# Patient Record
Sex: MALE | Race: AMERICAN INDIAN OR ALASKA NATIVE
[De-identification: names, ages, dates, MRNs, and addresses within clinical notes are randomized per-mention and may not be internally consistent; named-entity substitution may affect disease eponyms.]

---

## 2017-06-16 ENCOUNTER — HOSPITAL ENCOUNTER (EMERGENCY)
Dept: HOSPITAL 42 - ED | Age: 22
Discharge: HOME | End: 2017-06-16
Payer: SELF-PAY

## 2017-06-16 VITALS
DIASTOLIC BLOOD PRESSURE: 85 MMHG | HEART RATE: 73 BPM | SYSTOLIC BLOOD PRESSURE: 132 MMHG | OXYGEN SATURATION: 99 % | TEMPERATURE: 98.7 F | RESPIRATION RATE: 18 BRPM

## 2017-06-16 VITALS — BODY MASS INDEX: 25.1 KG/M2

## 2017-06-16 DIAGNOSIS — B34.9: Primary | ICD-10-CM

## 2017-06-16 NOTE — RAD
HISTORY:

cough  



COMPARISON:

No prior.



TECHNIQUE:

Chest PA and lateral



FINDINGS:



LUNGS:

No active pulmonary disease.



PLEURA:

No significant pleural effusion identified. No pneumothorax apparent.



CARDIOVASCULAR:

Normal.



OSSEOUS STRUCTURES:

No significant abnormalities.



VISUALIZED UPPER ABDOMEN:

Normal.



OTHER FINDINGS:

None.



IMPRESSION:

No active disease.

## 2017-06-16 NOTE — ED PDOC
Arrival/HPI





- General


Chief Complaint: Cough, Cold, Congestion


Time Seen by Provider: 06/16/17 13:52


Historian: Patient





- History of Present Illness


Narrative History of Present Illness (Text): 


06/16/17 14:06


A 21 year old male presents to the emergency department complaining of cough, 

subjective fever and a sore throat. Patient notes cough and subject fever has 

been presents for the past week but the sore throat developed yesterday. 

Patient states he has some chest discomfort when coughing but denies any nausea

, vomiting or other complaints. Patient is also specifically requesting STD 

testing because  he has history of std, although denies any dysuria or other 

complaints. 





Time/Duration: 1 week


Symptom Onset: Gradual


Symptom Course: Other


Quality: Other


Activities at Onset: Rest


Context: Home





Past Medical History





- Provider Review


Nursing Documentation Reviewed: Yes





- Past History


Past History: No Previous





- Infectious Disease


Hx of Infectious Diseases: None





- Tetanus Immunization


Tetanus Immunization: Up to Date





- Past Medical History


Past Medical History: No Previous





- Cardiac


Hx Cardiac Disorders: No





- Pulmonary


Hx Respiratory Disorders: Yes


Hx Asthma: Yes





- Neurological


Hx Neurological Disorder: No





- HEENT


Hx HEENT Disorder: No





- Renal


Hx Renal Disorder: No





- Endocrine/Metabolic


Hx Endocrine Disorders: No





- Hematological/Oncological


Hx Blood Disorders: No





- Integumentary


Hx Dermatological Disorder: No





- Musculoskeletal/Rheumatological


Hx Musculoskeletal Disorders: No





- Gastrointestinal


Hx Gastrointestinal Disorders: No





- Genitourinary/Gynecological


Hx Genitourinary Disorders: No





- Psychiatric


Hx Depression: No


Hx Emotional Abuse: No


Hx Physical Abuse: No


Hx Substance Use: No





- Past Surgical History


Past Surgical History: No Previous





- Surgical History


Hx Musculoskeletal Surgery: Yes


Hx Tonsillectomy: Yes





- Anesthesia


Hx Anesthesia: Yes


Hx Anesthesia Reactions: No


Hx Malignant Hyperthermia: No





- Suicidal Assessment


Feels Threatened In Home Enviroment: No





Family/Social History





- Physician Review


Nursing Documentation Reviewed: Yes


Family/Social History: Unknown Family HX


Smoking Status: Heavy Smoker > 10 Cigarettes Daily


Hx Alcohol Use: No


Hx Substance Use: No


Substance used: Marijuana


Hx Substance Use Treatment: No





Allergies/Home Meds


Allergies/Adverse Reactions: 


Allergies





No Known Allergies Allergy (Verified 12/17/16 15:22)


 








Home Medications: 


 Home Meds











 Medication  Instructions  Recorded  Confirmed


 


No Known Home Med  12/17/16 12/17/16














Review of Systems





- Physician Review


All systems were reviewed & negative as marked: Yes





- Review of Systems


Constitutional: Fevers


Respiratory: Cough.  absent: SOB, Sputum


Cardiovascular: Chest Pain


Gastrointestinal: absent: Nausea, Vomiting





Physical Exam





- Physical Exam


Narrative Physical Exam (Text): 





patient on cellphone and in no acute distress


Vital Signs Reviewed: Yes


Vital Signs











  Temp Pulse Resp BP Pulse Ox


 


 06/16/17 13:39  98.7 F  73  18  132/85  99











Temperature: Afebrile


Blood Pressure: Normal


Pulse: Regular


Respiratory Rate: Normal


Appearance: Positive for: Well-Appearing, Non-Toxic, Comfortable


Pain Distress: None


Mental Status: Positive for: Alert and Oriented X 3





- Systems Exam


Head: Present: Atraumatic, Normocephalic


Pupils: Present: PERRL


Extroacular Muscles: Present: EOMI


Conjunctiva: Present: Normal


Mouth: Present: Moist Mucous Membranes


Pharnyx: Present: ERYTHEMA.  No: EXUDATE, TONSILS ENLARGED, Peritonsilar 

Swelling, Uvular Deviation


Neck: Present: Normal Range of Motion


Respiratory/Chest: Present: Clear to Auscultation, Good Air Exchange.  No: 

Respiratory Distress, Accessory Muscle Use


Cardiovascular: Present: Regular Rate and Rhythm, Normal S1, S2.  No: Murmurs


Abdomen: Present: Normal Bowel Sounds.  No: Tenderness, Distention, Peritoneal 

Signs


Back: Present: Normal Inspection


Upper Extremity: Present: Normal Inspection.  No: Cyanosis, Edema


Lower Extremity: Present: Normal Inspection.  No: Edema


Neurological: Present: GCS=15, CN II-XII Intact, Speech Normal


Skin: Present: Warm, Dry, Normal Color.  No: Rashes


Psychiatric: Present: Alert, Oriented x 3, Normal Insight, Normal Concentration





Medical Decision Making


ED Course and Treatment: 


06/16/17 14:06


Impression:


A 21 ear old male with a cough, subjective fever and sore throat. 





Differential Diagnosis include but are not limited to: pharyngitis vs. 

influenza vs. Bronchitis





Plan:


-- Chest X-ray


-- Rapid Strep


-- Influenza


-- Reassess and disposition





Prior Visits:


Notes and results from previous visits were reviewed. The patient last 

presented to the emergency department on 12/17/16 for sutural removal. 





Progress Notes:











- Lab Interpretations


Lab Results: 


 Lab Results





06/16/17 15:00: Influenza Typ A,B (EIA) Negative for flu a/b, Grp A Beta Strep 

Ag Negative








I have reviewed the lab results: Yes





- RAD Interpretation


Radiology Orders: 








06/16/17 14:02


CHEST TWO VIEWS (PA/LAT) [RAD] Stat 














- Scribe Statement


The provider has reviewed the documentation as recorded by the Scribe





Ioana Olvera





Provider Scribe Attestation:


All medical record entries made by the Scribe were at my direction and 

personally dictated by me. I have reviewed the chart and agree that the record 

accurately reflects my personal performance of the history, physical exam, 

medical decision making, and the department course for this patient. I have 

also personally directed, reviewed, and agree with the discharge instructions 

and disposition.











Disposition/Present on Arrival





- Present on Arrival


Any Indicators Present on Arrival: No


History of DVT/PE: No


History of Uncontrolled Diabetes: No


Urinary Catheter: No


History of Decub. Ulcer: No


History Surgical Site Infection Following: None





- Disposition


Have Diagnosis and Disposition been Completed?: Yes


Diagnosis: 


 Viral syndrome





Disposition: HOME/ ROUTINE


Disposition Time: 15:46


Condition: STABLE


Discharge Instructions (ExitCare):  Chest Pain (ED), Viral Syndrome (ED)


Referrals: 


PCP,NO [Primary Care Provider] - Follow up with primary


[facility] [Outside] - Follow up with primary


[facility] [Outside] - Follow up with primary

## 2018-02-05 ENCOUNTER — HOSPITAL ENCOUNTER (EMERGENCY)
Dept: HOSPITAL 42 - ED | Age: 23
Discharge: HOME | End: 2018-02-05
Payer: COMMERCIAL

## 2018-02-05 VITALS — TEMPERATURE: 98.9 F

## 2018-02-05 VITALS
DIASTOLIC BLOOD PRESSURE: 78 MMHG | HEART RATE: 75 BPM | OXYGEN SATURATION: 99 % | RESPIRATION RATE: 17 BRPM | SYSTOLIC BLOOD PRESSURE: 119 MMHG

## 2018-02-05 VITALS — BODY MASS INDEX: 25.1 KG/M2

## 2018-02-05 DIAGNOSIS — R10.9: Primary | ICD-10-CM

## 2018-02-05 DIAGNOSIS — F17.210: ICD-10-CM

## 2018-02-05 NOTE — ED PDOC
Arrival/HPI





- General


Chief Complaint: Abdominal Pain


Time Seen by Provider: 02/05/18 14:54


Historian: Patient





- History of Present Illness


Narrative History of Present Illness (Text): 





02/05/18 15:18


21yo male with no PMhx present with complaint of intermittent abdominal pain x 

2weeks. States he had diarrhea over a week ago that resolved. He reports that 

he works as a  and usually gets nauseous when he is at work, 

otherwise he denies nausea and vomiting. states pain is usually crampy and 

worse after eating. He couldn't localize the pain. Ge denies fever, chills, 

urinary symptoms, back pain, chest pain, any other complaint.





Past Medical History





- Provider Review


Nursing Documentation Reviewed: Yes





- Past History


Past History: No Previous





- Infectious Disease


Hx of Infectious Diseases: None





- Tetanus Immunization


Tetanus Immunization: Up to Date





- Past Medical History


Past Medical History: No Previous





- Cardiac


Hx Cardiac Disorders: No





- Pulmonary


Hx Respiratory Disorders: Yes


Hx Asthma: Yes





- Neurological


Hx Neurological Disorder: No





- HEENT


Hx HEENT Disorder: No





- Renal


Hx Renal Disorder: No





- Endocrine/Metabolic


Hx Endocrine Disorders: No





- Hematological/Oncological


Hx Blood Disorders: No





- Integumentary


Hx Dermatological Disorder: No





- Musculoskeletal/Rheumatological


Hx Musculoskeletal Disorders: No





- Gastrointestinal


Hx Gastrointestinal Disorders: No





- Genitourinary/Gynecological


Hx Genitourinary Disorders: No





- Psychiatric


Hx Depression: No


Hx Emotional Abuse: No


Hx Physical Abuse: No


Hx Substance Use: No





- Past Surgical History


Past Surgical History: No Previous





- Surgical History


Hx Musculoskeletal Surgery: Yes


Hx Orthopedic Surgery: Yes (left clavical)


Hx Tonsillectomy: Yes





- Anesthesia


Hx Anesthesia: Yes


Hx Anesthesia Reactions: No


Hx Malignant Hyperthermia: No





- Suicidal Assessment


Feels Threatened In Home Enviroment: No





Family/Social History





- Physician Review


Nursing Documentation Reviewed: Yes


Family/Social History: Unknown Family HX


Smoking Status: Heavy Smoker > 10 Cigarettes Daily


Hx Alcohol Use: No


Hx Substance Use: No


Substance used: Marijuana


Hx Substance Use Treatment: No





Allergies/Home Meds


Allergies/Adverse Reactions: 


Allergies





No Known Allergies Allergy (Verified 02/05/18 14:53)


 











Review of Systems





- Physician Review


All systems were reviewed & negative as marked: Yes





- Review of Systems


Constitutional: Normal


Eyes: Normal


ENT: Normal


Respiratory: Normal


Cardiovascular: Normal


Gastrointestinal: Abdominal Pain.  absent: Constipation, Diarrhea, Nausea, 

Vomiting, Hematochezia, Hematemesis


Genitourinary Male: Normal


Musculoskeletal: Normal


Skin: Normal


Neurological: Normal


Endocrine: Normal


Hemo/Lymphatic: Normal


Psychiatric: Normal





Physical Exam


Vital Signs Reviewed: Yes


Vital Signs











  Temp Pulse Resp BP Pulse Ox


 


 02/05/18 14:50  98.9 F  80  16  121/80  100











Temperature: Afebrile


Blood Pressure: Normal


Pulse: Regular


Respiratory Rate: Normal


Appearance: Positive for: Well-Appearing, Non-Toxic, Comfortable


Pain Distress: None


Mental Status: Positive for: Alert and Oriented X 3





- Systems Exam


Head: Present: Atraumatic, Normocephalic


Pupils: Present: PERRL


Extroacular Muscles: Present: EOMI


Conjunctiva: Present: Normal


Mouth: Present: Moist Mucous Membranes


Neck: Present: Normal Range of Motion


Respiratory/Chest: Present: Clear to Auscultation, Good Air Exchange.  No: 

Respiratory Distress, Accessory Muscle Use


Cardiovascular: Present: Regular Rate and Rhythm, Normal S1, S2.  No: Murmurs


Abdomen: Present: Other (soft).  No: Tenderness, Distention, Normal Bowel 

Sounds (Hyperactive BS x 2), Peritoneal Signs, Rebound, Guarding, McBurney's 

Point Tender, Rovsing's Sign Present


Back: Present: Normal Inspection


Upper Extremity: Present: Normal Inspection.  No: Cyanosis, Edema


Lower Extremity: Present: Normal Inspection.  No: Edema


Neurological: Present: GCS=15, CN II-XII Intact, Speech Normal


Skin: Present: Warm, Dry, Normal Color.  No: Rashes


Psychiatric: Present: Alert, Oriented x 3, Normal Insight, Normal Concentration





Medical Decision Making


ED Course and Treatment: 





02/05/18 15:56


PT in ED for stated history. he was comfortable and hemodynamically stable. 

Abdominal exam was soft and benign. PT was treated with GI cocktail for 

gastritis. On re evaluation he notes that his pain resolved. He will be DC home 

with a rx of pepcid. Referred to a GI. TRT ED for any new or worsening symptoms





- Medication Orders


Current Medication Orders: 











Discontinued Medications





Al Hydrox/Mg Hydrox/Simethicone (Maalox Plus 30 Ml)  30 ml PO STAT STA


   Stop: 02/05/18 14:59


   Last Admin: 02/05/18 15:30  Dose: 30 ml





Belladonna/Phenobarbital (Donnatal Elixir)  5 ml PO STAT STA


   Stop: 02/05/18 15:00


   Last Admin: 02/05/18 15:30  Dose: 5 ml





Lidocaine HCl (Lidocaine 2% Viscous)  15 ml PO ONCE STA


   Stop: 02/05/18 15:00


   Last Admin: 02/05/18 15:30  Dose: 15 ml











Disposition/Present on Arrival





- Present on Arrival


Any Indicators Present on Arrival: No


History of DVT/PE: No


History of Uncontrolled Diabetes: No


Urinary Catheter: No


History of Decub. Ulcer: No


History Surgical Site Infection Following: None





- Disposition


Have Diagnosis and Disposition been Completed?: Yes


Diagnosis: 


 Abdominal pain





Disposition: HOME/ ROUTINE


Disposition Time: 16:00


Patient Plan: Discharge


Condition: STABLE


Discharge Instructions (ExitCare):  Abdominal Pain (ED)


Additional Instructions: 


Follow up with a Gastroenterologist


Return to ED for any new or worsening symptom


Prescriptions: 


Famotidine [Pepcid] 40 mg PO DAILY #15 tab


Referrals: 


PCP,NO [Primary Care Provider] - Follow up with primary


Marco A Mckoy MD [Staff Provider] - Follow up with primary


Forms:  [employer] (English)

## 2018-09-10 ENCOUNTER — HOSPITAL ENCOUNTER (EMERGENCY)
Dept: HOSPITAL 42 - ED | Age: 23
LOS: 1 days | Discharge: HOME | End: 2018-09-11
Payer: SELF-PAY

## 2018-09-10 VITALS — BODY MASS INDEX: 22.8 KG/M2

## 2018-09-10 DIAGNOSIS — F17.210: ICD-10-CM

## 2018-09-10 DIAGNOSIS — K04.7: Primary | ICD-10-CM

## 2018-09-11 VITALS
HEART RATE: 70 BPM | RESPIRATION RATE: 17 BRPM | DIASTOLIC BLOOD PRESSURE: 85 MMHG | TEMPERATURE: 98.4 F | SYSTOLIC BLOOD PRESSURE: 156 MMHG | OXYGEN SATURATION: 100 %

## 2018-09-11 NOTE — ED PDOC
Arrival/HPI





<Emile Longo - Last Filed: 09/11/18 00:58>





- General


Historian: Patient





- History of Present Illness


Time/Duration: > month


Symptom Onset: Gradual


Symptom Course: Intermittent, Worsening


Quality: Aching





<Mick Vázquez - Last Filed: 09/11/18 01:09>





- General


Chief Complaint: Dental Pain


Time Seen by Provider: 09/10/18 23:56





- History of Present Illness


Narrative History of Present Illness (Text): 





Patient is a 23 year old male with no past medical history presenting to the 

emergency room with tooth pain and facial swelling. Patient was seen in the 

emergency room earlier this morning at [facility] for tooth pain and facial swelling. 

He was discharged home with prescriptions for Augmentin and Naproxen and told 

to call and schedule an appointment with a Dentist. Patient filled the 

prescription for Augment, took a pill this afternoon and took a nap. Upon waking

, he noticed the swelling in his face has increased and he became worried so he 

decided to come to the emergency room. He has not called and scheduled an 

appointment with a Dentist yet. Patient reports first experiencing dental pain 

a couple of months ago. He experiences swelling intermittently throughout the 

past couple of months. He has not seen a dentist for the pain or swelling 

previously. He does not have any shortness of breath, difficulty breathing or 

difficulty swallowing. Denies fevers, chills, nausea vomiting, diarrhea, 

constipation, chest pain, abdominal pain, numbness or tingling.


 (Mick Vázquez)





Past Medical History





- Provider Review


Nursing Documentation Reviewed: Yes





- Past History


Past History: No Previous





- Infectious Disease


Hx of Infectious Diseases: None





- Tetanus Immunization


Tetanus Immunization: Up to Date





- Past Medical History


Past Medical History: No Previous





- Cardiac


Hx Cardiac Disorders: No





- Pulmonary


Hx Respiratory Disorders: Yes


Hx Asthma: Yes





- Neurological


Hx Neurological Disorder: No





- HEENT


Hx HEENT Disorder: No





- Renal


Hx Renal Disorder: No





- Endocrine/Metabolic


Hx Endocrine Disorders: No





- Hematological/Oncological


Hx Blood Disorders: No





- Integumentary


Hx Dermatological Disorder: No





- Musculoskeletal/Rheumatological


Hx Musculoskeletal Disorders: No





- Gastrointestinal


Hx Gastrointestinal Disorders: No





- Genitourinary/Gynecological


Hx Genitourinary Disorders: No





- Psychiatric


Hx Depression: No


Hx Emotional Abuse: No


Hx Physical Abuse: No


Hx Substance Use: No





- Past Surgical History


Past Surgical History: No Previous





- Surgical History


Hx Musculoskeletal Surgery: Yes


Hx Orthopedic Surgery: Yes (left clavical)


Hx Tonsillectomy: Yes





- Anesthesia


Hx Anesthesia: Yes


Hx Anesthesia Reactions: No


Hx Malignant Hyperthermia: No





- Suicidal Assessment


Feels Threatened In Home Enviroment: No





<Mick Vázquez - Last Filed: 09/11/18 01:09>





Family/Social History





- Physician Review


Nursing Documentation Reviewed: Yes


Family/Social History: No Known Family HX


Smoking Status: Heavy Smoker > 10 Cigarettes Daily


Hx Alcohol Use: No


Hx Substance Use: No


Substance used: Marijuana


Hx Substance Use Treatment: No





<Mick Vázquez - Last Filed: 09/11/18 01:09>





Allergies/Home Meds





<Emile Longo - Last Filed: 09/11/18 00:58>





<Mick Vázquez - Last Filed: 09/11/18 01:09>


Allergies/Adverse Reactions: 


Allergies





No Known Allergies Allergy (Verified 02/05/18 14:53)


 








Home Medications: 


 Home Meds











 Medication  Instructions  Recorded  Confirmed


 


Amoxicillin/Clavulanate [Augmentin 1 tab PO Q12 09/10/18 09/10/18





875 MG-125 MG]   














Review of Systems





- Physician Review


All systems were reviewed & negative as marked: Yes





- Review of Systems


Constitutional: Normal.  absent: Fatigue, Fevers


Eyes: Normal


ENT: Other (dental pain, facial swelling).  absent: Tinnitus, TMJ Pain, Sore 

Throat, Rhinorrhea


Respiratory: Normal.  absent: SOB, Cough, Sputum


Cardiovascular: Normal.  absent: Chest Pain, Palpitations, Edema, Calf Pain, LAGOS

, Syncope


Gastrointestinal: Normal.  absent: Abdominal Pain, Constipation, Diarrhea, 

Nausea, Vomiting


Musculoskeletal: Normal


Skin: Other (facial swelling)


Neurological: Normal.  absent: Headache, Dizziness


Endocrine: Normal


Hemo/Lymphatic: Normal.  absent: Adenopathy


Psychiatric: Normal





<Mick Vázquez - Last Filed: 09/11/18 01:09>





Physical Exam


Vital Signs Reviewed: Yes


Temperature: Afebrile


Blood Pressure: Hypertensive


Pulse: Regular


Respiratory Rate: Normal


Appearance: Positive for: Well-Appearing, Non-Toxic, Comfortable


Pain Distress: None


Mental Status: Positive for: Alert and Oriented X 3





- Systems Exam


Head: Present: Swelling (facial swelling, located on right side of face)


Pupils: Present: PERRL


Extroacular Muscles: Present: EOMI


Conjunctiva: Present: Normal


Ears: Present: Other (no tenderness to mastoids b/l)


Mouth: Present: Moist Mucous Membranes, Other (buccal swelling, no swelling 

inside oral pharnyx).  No: Normal Teeth (Tooth #2 (2nd molar on right side) 

eroded to gum line)


Pharnyx: Present: Normal.  No: ERYTHEMA, EXUDATE, TONSILS ENLARGED, 

Peritonsilar Swelling, Uvular Deviation, Strider, Soft Palate/Uvular Edema


Nose (External): Present: Atraumatic


Nose (Internal): Present: Normal Inspection, No Active Bleeding, Moist


Neck: Present: Normal Range of Motion


Respiratory/Chest: Present: Clear to Auscultation, Good Air Exchange.  No: 

Respiratory Distress, Accessory Muscle Use


Cardiovascular: Present: Regular Rate and Rhythm, Normal S1, S2.  No: Murmurs


Abdomen: No: Tenderness, Distention, Peritoneal Signs


Back: Present: Normal Inspection


Upper Extremity: Present: Normal Inspection, NORMAL PULSES.  No: Cyanosis, Edema


Lower Extremity: Present: Normal Inspection, NORMAL PULSES.  No: Edema, CALF 

TENDERNESS


Neurological: Present: GCS=15, Speech Normal, Motor Func Grossly Intact


Skin: Present: Warm, Dry, Normal Color.  No: Rashes


Lymphatic: No: Cervical Adenopathy, Axillary Adenopathy


Psychiatric: Present: Alert, Oriented x 3, Normal Insight, Normal Concentration





<Mick Vázquez - Last Filed: 09/11/18 01:09>


Vital Signs











  Temp Pulse Resp BP Pulse Ox


 


 09/11/18 00:22  98.4 F  70  17  156/85 H  100














Medical Decision Making





<Emile Longo - Last Filed: 09/11/18 00:58>


Re-evaluation Time: 01:00


Reassessment Condition: Improving,but remains with symptoms





<Mick Vázquez - Last Filed: 09/11/18 01:09>


ED Course and Treatment: 


Impression:


Pt seen and evaluated with medical resident. Aware and agree with HPI, clinical 

findings, plan, and management. Pt presented for dental pain and right-sided 

facial swelling.





Plan:


-- Ultram


-- Reassess and disposition


 (Emile Longo)





09/11/18 00:24


Patient has been experiencing intermittent swelling over the past couple of 

months. He has already started antibiotic therapy. He has already been advised 

to follow up with the dental clinic at [facility]. He is afebrile, vital signs 

stable. No indication for labs or additional testing at this time.


Ultram for pain





09/11/18 01:05


Patient has been experiencing this problem for months. He is afebrile and 

already started on antibiotic therapy. Ultram has helped improve the pain. He 

is agreeable to follow up with dental clinic at [facility] in the morning. Patient 

discharged with strict instructions, which were stressed to patient multiple 

times, to follow up with dental clinic in the morning.  Patient is agreeable 

and states he will follow up in the morning. 


09/11/18 01:08


 (Mick Vázquez)





- Medication Orders


Current Medication Orders: 











Discontinued Medications





Tramadol HCl (Ultram)  50 mg PO STAT STA


   Stop: 09/11/18 00:33


   Last Admin: 09/11/18 00:40 Dose:  50 mg











- PA / NP / Resident Statement


MD/ has reviewed & agrees with the documentation as recorded.


MD/ has examined the patient and agrees with the treatment plan.





<Emile Longo - Last Filed: 09/11/18 00:58>





Disposition/Present on Arrival





<Emile Longo - Last Filed: 09/11/18 00:58>





- Present on Arrival


Any Indicators Present on Arrival: No


History of DVT/PE: No


History of Uncontrolled Diabetes: No


Urinary Catheter: No


History of Decub. Ulcer: No


History Surgical Site Infection Following: None





- Disposition


Have Diagnosis and Disposition been Completed?: Yes


Disposition Time: 01:00


Patient Plan: Discharge





<Mick Vázquez - Last Filed: 09/11/18 01:09>





- Disposition


Diagnosis: 


 Infected tooth





Disposition: HOME/ ROUTINE


Condition: GUARDED


Discharge Instructions (ExitCare):  Dental Pain (DC)


Additional Instructions: 


Patient was already instructed to follow up with the Dental Clinic at [facility]. 

This was stressed again to patient to follow up with Dental Clinic at [facility] in 

the morning of Sept. 11, 2018. 





Patient given a prescription of Ultram 50mg tab - disp 8 tabs, for pain relief. 


Prescriptions: 


Tramadol HCl [Ultram] 50 mg PO Q6 PRN #8 tab


 PRN Reason: Pain, Moderate (4-7)


Referrals: 


PCP,NO [Primary Care Provider] - Follow up with primary


Forms:  [facility] (English)

## 2019-04-02 ENCOUNTER — HOSPITAL ENCOUNTER (EMERGENCY)
Dept: HOSPITAL 42 - ED | Age: 24
Discharge: HOME | End: 2019-04-02
Payer: COMMERCIAL

## 2019-04-02 VITALS — RESPIRATION RATE: 17 BRPM

## 2019-04-02 VITALS
SYSTOLIC BLOOD PRESSURE: 133 MMHG | TEMPERATURE: 97.6 F | DIASTOLIC BLOOD PRESSURE: 73 MMHG | OXYGEN SATURATION: 100 % | HEART RATE: 73 BPM

## 2019-04-02 VITALS — BODY MASS INDEX: 22.8 KG/M2

## 2019-04-02 DIAGNOSIS — J11.1: Primary | ICD-10-CM

## 2019-04-02 DIAGNOSIS — F17.210: ICD-10-CM

## 2019-04-02 LAB — INFLUENZA A B: (no result)

## 2019-04-02 NOTE — ED PDOC
Arrival/HPI





- General


Chief Complaint: Cough, Cold, Congestion


Time Seen by Provider: 04/02/19 13:03


Historian: Patient





- History of Present Illness


Narrative History of Present Illness (Text): 





04/02/19 15:50


23-year-old male presents today with a 2-day history of cough nasal congestion 

sore throat body aches and fatigue.  Patient states he did not get his flu shot 

this year.  He  states he feels feverish but did not take his temperature at 

home.  Patient states he has a history of asthma and was coughing a lot today.  

Patient denies vomiting or diarrhea.  No chest pain.  He denies shortness of 

breath at present time.  He denies abdominal pain.  Denies sick contacts.  No 

other complaints 





Past Medical History





- Provider Review


Nursing Documentation Reviewed: Yes





- Travel History


Have you recently traveled outside [place] w/in the past 3 mons?: No





- Past History


Past History: No Previous





- Infectious Disease


Hx of Infectious Diseases: None





- Tetanus Immunization


Tetanus Immunization: Up to Date





- Past Medical History


Past Medical History: No Previous





- Cardiac


Hx Cardiac Disorders: No





- Pulmonary


Hx Respiratory Disorders: Yes


Hx Asthma: Yes





- Neurological


Hx Neurological Disorder: No





- HEENT


Hx HEENT Disorder: No





- Renal


Hx Renal Disorder: No





- Endocrine/Metabolic


Hx Endocrine Disorders: No





- Hematological/Oncological


Hx Blood Disorders: No





- Integumentary


Hx Dermatological Disorder: No





- Musculoskeletal/Rheumatological


Hx Musculoskeletal Disorders: Yes


Hx Fractures: Yes





- Gastrointestinal


Hx Gastrointestinal Disorders: No





- Genitourinary/Gynecological


Hx Genitourinary Disorders: No





- Psychiatric


Hx Depression: No


Hx Emotional Abuse: No


Hx Physical Abuse: No


Hx Substance Use: No





- Past Surgical History


Past Surgical History: No Previous





- Surgical History


Hx Musculoskeletal Surgery: Yes


Hx Orthopedic Surgery: Yes (left clavical)


Hx Tonsillectomy: Yes





- Anesthesia


Hx Anesthesia: Yes


Hx Anesthesia Reactions: No


Hx Malignant Hyperthermia: No





- Suicidal Assessment


Feels Threatened In Home Enviroment: No





Family/Social History





- Physician Review


Nursing Documentation Reviewed: Yes


Family/Social History: Unknown Family HX


Smoking Status: Heavy Smoker > 10 Cigarettes Daily


Hx Alcohol Use: No


Hx Substance Use: No


Substance used: Marijuana


Hx Substance Use Treatment: No





Allergies/Home Meds


Allergies/Adverse Reactions: 


Allergies





No Known Allergies Allergy (Verified 04/02/19 12:58)


   











Review of Systems





- Review of Systems


Constitutional: Fatigue.  absent: Fevers


ENT: Sore Throat, Sinus Congestion


Respiratory: Cough.  absent: SOB


Cardiovascular: absent: Chest Pain, Palpitations


Gastrointestinal: Nausea.  absent: Abdominal Pain, Vomiting


Genitourinary Male: absent: Dysuria


Musculoskeletal: absent: Arthralgias, Back Pain, Neck Pain


Skin: absent: Rash, Pruritis


Neurological: Headache.  absent: Dizziness


Psychiatric: absent: Anxiety, Depression





Physical Exam


Vital Signs Reviewed: Yes





Vital Signs











  Temp Pulse Resp BP Pulse Ox


 


 04/02/19 12:59  98.5 F  88  17  132/80  98











Temperature: Afebrile


Blood Pressure: Normal


Pulse: Regular


Respiratory Rate: Normal


Appearance: Positive for: Well-Appearing, Non-Toxic, Comfortable


Pain Distress: None


Mental Status: Positive for: Alert and Oriented X 3





- Systems Exam


Head: Present: Atraumatic


Pupils: Present: PERRL


Extroacular Muscles: Present: EOMI


Conjunctiva: Present: Normal


Ears: Present: Normal, NORMAL TM


Mouth: Present: Moist Mucous Membranes.  No: Drooling, Trismus


Pharnyx: Present: Normal.  No: ERYTHEMA, EXUDATE, TONSILS ENLARGED, Peritonsilar

Swelling, Uvular Deviation, Muffled/Hoarse Voice


Nose (External): Present: Atraumatic


Nose (Internal): Present: Normal Inspection


Neck: Present: Normal Range of Motion, Trachea Midline


Respiratory/Chest: Present: Clear to Auscultation, Good Air Exchange.  No: 

Respiratory Distress, Accessory Muscle Use


Cardiovascular: Present: Regular Rate and Rhythm, Normal S1, S2.  No: Murmurs


Abdomen: No: Tenderness, Distention, Peritoneal Signs, Rebound, Guarding


Back: Present: Normal Inspection


Upper Extremity: Present: Normal ROM


Lower Extremity: Present: Normal ROM


Neurological: Present: GCS=15, Speech Normal


Skin: Present: Warm, Dry, Normal Color.  No: Rashes


Psychiatric: Present: Alert, Oriented x 3





Medical Decision Making


ED Course and Treatment: 





04/02/19 15:54


Patient is nontoxic well-appearing in no distress with stable vital signs 

resting comfortably in the emergency room. listening to music with headphones 

in. 








rapid flu; negative





rapid strep: negative. 








cxr; no infiltrate or effusion, no cardiomegaly. 








pt reassessment; pt feeling better after medications; vitals stable. 








all results discussed with patient in depth; advised f/u with pmd. advised 

increase fluids, tamiflu twice daily x 5 day and return immediately if symptoms 

worsen,persist or if new symptoms develop. 





Patient verbalizes understanding of discharge instructions and need for 

immediate followup.








All aspects of this case were discussed the attending of record.











Impression: Flulike symptoms


Motrin one tablet every 6 hours as needed for pain/fever reduction


Tamiflu: 1 capsule twice daily x 5 days


Albuterol; 2 puffs every 4-6 hours as needed for cough.


Increase fluids


Followup with primary care physician the next 2 days


Return if symptoms worsen persist or if new symptoms develop: Continued high 

fevers, dizziness, weakness, chest pain or shortness of breath vomiti

ng/diarrhea, or if any other concerning symptoms develop





Reassessment Condition: Re-examined, Improved





- RAD Interpretation


Radiology Orders: 











04/02/19 13:55


CHEST TWO VIEWS (PA/LAT) [RAD] Stat 














- Medication Orders


Current Medication Orders: 














Discontinued Medications





Acetaminophen (Tylenol 325mg Tab)  975 mg PO STAT STA


   Stop: 04/02/19 13:56


   Last Admin: 04/02/19 14:00  Dose: 975 mg





MAR Pain/Vitals


 Document     04/02/19 14:00  OCS  (Rec: 04/02/19 14:01  OCS  [id number])


     Pain Reassessment


      Is This A Pain ReAssessment?               No


     Sleep


      Is patient sleeping during reassessment?   No


     Presence of Pain


      Presence of Pain                           Yes


     Pain Scale Used


     Protocol:  PSCALES


      Pain Scale Used                            Numeric


     Location


      Pain Location Body Site                    Head


      Intensity                                  5


      Scale Used                                 Numeric


      Pain Behavior                              Facial Grimacing


      Aggravating Factors                        ADL's





Albuterol Sulfate (Albuterol 0.083% Inhal Sol (2.5 Mg/3 Ml) Ud)  2.5 mg IH STAT 

STA


   Stop: 04/02/19 13:56


   Last Admin: 04/02/19 14:01  Dose: 2.5 mg











Disposition/Present on Arrival





- Present on Arrival


Any Indicators Present on Arrival: No


History of DVT/PE: No


History of Uncontrolled Diabetes: No


Urinary Catheter: No


History of Decub. Ulcer: No


History Surgical Site Infection Following: None





- Disposition


Have Diagnosis and Disposition been Completed?: Yes


Diagnosis: 


 Flu-like symptoms





Disposition: HOME/ ROUTINE


Disposition Time: 15:30


Patient Plan: Discharge


Patient Problems: 


                             Current Active Problems











Problem Status Onset


 


Flu-like symptoms Acute 











Condition: GOOD


Discharge Instructions (ExitCare):  Flu, Adult (DC)


Additional Instructions: 


Motrin one tablet every 6 hours as needed for pain/fever reduction


Tamiflu: 1 capsule twice daily x 5 days


Albuterol; 2 puffs every 4-6 hours as needed for cough.


Increase fluids


Followup with primary care physician the next 2 days


Return if symptoms worsen persist or if new symptoms develop: Continued high 

fevers, dizziness, weakness, chest pain or shortness of breath 

vomiting/diarrhea, or if any other concerning symptoms develop








Prescriptions: 


Albuterol HFA [Ventolin HFA 90 mcg/actuation (8 g)] 2 puff IH [id number] PRN #1 

inhaler


 PRN Reason: Cough


Ibuprofen [Motrin] 600 mg PO Q6H PRN #20 tab


 PRN Reason: pain/fever reduction


Oseltamivir Cap [Tamiflu] 75 mg PO BID #10 cap


Referrals: 


Arleth Gonsalves MD [Medical Doctor] - Follow up with primary


 Service [Outside] - Follow up with primary


Forms:  [facility] (English), WORK NOTE

## 2019-04-02 NOTE — RAD
Date of service: 



04/02/2019



HISTORY:

 cough 



COMPARISON:

06/16/2017



TECHNIQUE:

Chest PA and lateral views



FINDINGS:



LUNGS:

No active pulmonary disease.



PLEURA:

No significant pleural effusion identified. No pneumothorax apparent.



CARDIOVASCULAR:

No aortic atherosclerotic calcification present.



Normal cardiac size. No pulmonary vascular congestion. 



OSSEOUS STRUCTURES:

No significant abnormalities.



VISUALIZED UPPER ABDOMEN:

Normal.



OTHER FINDINGS:

None.



IMPRESSION:

No active disease.